# Patient Record
Sex: FEMALE | Race: WHITE | Employment: OTHER | URBAN - METROPOLITAN AREA
[De-identification: names, ages, dates, MRNs, and addresses within clinical notes are randomized per-mention and may not be internally consistent; named-entity substitution may affect disease eponyms.]

---

## 2022-03-18 PROBLEM — K21.9 GASTROESOPHAGEAL REFLUX DISEASE: Status: ACTIVE | Noted: 2017-03-29

## 2022-03-18 PROBLEM — J30.9 ALLERGIC RHINITIS: Status: ACTIVE | Noted: 2017-03-29

## 2022-03-19 PROBLEM — G89.29 CHRONIC PAIN: Status: ACTIVE | Noted: 2017-03-29

## 2022-03-19 PROBLEM — M19.049 DEGENERATIVE JOINT DISEASE OF HAND: Status: ACTIVE | Noted: 2017-03-29

## 2022-03-19 PROBLEM — M51.36 DEGENERATION OF LUMBAR INTERVERTEBRAL DISC: Status: ACTIVE | Noted: 2017-03-29

## 2022-03-19 PROBLEM — E04.2 NONTOXIC MULTINODULAR GOITER: Status: ACTIVE | Noted: 2017-03-29

## 2022-03-19 PROBLEM — M25.569 KNEE PAIN: Status: ACTIVE | Noted: 2017-03-29

## 2022-03-19 PROBLEM — R79.9 BLOOD CHEMISTRY ABNORMALITY: Status: ACTIVE | Noted: 2017-03-29

## 2022-03-19 PROBLEM — R13.10 DYSPHAGIA: Status: ACTIVE | Noted: 2017-03-29

## 2022-03-20 PROBLEM — M17.9 OSTEOARTHRITIS OF KNEE: Status: ACTIVE | Noted: 2017-03-29

## 2022-03-20 PROBLEM — R73.09 ABNORMAL GLUCOSE LEVEL: Status: ACTIVE | Noted: 2017-03-29

## 2022-03-20 PROBLEM — R25.2 SPASM: Status: ACTIVE | Noted: 2017-03-29

## 2022-03-20 PROBLEM — J45.909 ASTHMA: Status: ACTIVE | Noted: 2017-03-29

## 2022-03-20 PROBLEM — M81.0 OSTEOPOROSIS: Status: ACTIVE | Noted: 2017-03-29

## 2022-06-06 ENCOUNTER — TELEMEDICINE (OUTPATIENT)
Dept: RHEUMATOLOGY | Age: 72
End: 2022-06-06
Payer: MEDICARE

## 2022-06-06 DIAGNOSIS — M51.36 DEGENERATION OF LUMBAR INTERVERTEBRAL DISC: ICD-10-CM

## 2022-06-06 DIAGNOSIS — M50.30 DEGENERATION OF CERVICAL INTERVERTEBRAL DISC: Primary | ICD-10-CM

## 2022-06-06 DIAGNOSIS — M62.838 MUSCLE SPASM: ICD-10-CM

## 2022-06-06 DIAGNOSIS — Z79.1 LONG TERM (CURRENT) USE OF NON-STEROIDAL ANTI-INFLAMMATORIES (NSAID): ICD-10-CM

## 2022-06-06 PROCEDURE — 99443 PR PHYS/QHP TELEPHONE EVALUATION 21-30 MIN: CPT | Performed by: INTERNAL MEDICINE

## 2022-06-06 RX ORDER — TIZANIDINE 4 MG/1
TABLET ORAL
Qty: 45 TABLET | Refills: 5 | Status: SHIPPED | OUTPATIENT
Start: 2022-06-06

## 2022-06-06 NOTE — PROGRESS NOTES
appliable (enbrel, humira, simponi, cimzia, xeljanz, GARCIA, remicade, simponi aria, actemra, rituximab, Charlet Smooth, stelara, cosentyx): None      Past NSAIDs, if applicable (motrin, aleve, naproxen, advil, ibuprofen, celebrex, voltaren/diclofenac, etc.): Sulfasalazine, Naproxen, Mobic, Celebrex, Relafen in the past. Aleve, Advil PRN.   Currently on diclofenac 50 mg twice a day.      Last BMD: <2  years ago   Past osteoporosis drugs, if applicable (fosamax, actonel, boniva, reclast, prolia, forteo): Prolia last administered 2018 but decided not to pursue that since she does believe that it worsened her lower back pain.       Current exercise regimen, if any: runs [place] 7 days    Current vitamin D dose: D3 5000 iu daily. Current calcium dose: Calcium 400 mg once a day. Fractures since last visit, if any:  None    The patient otherwise has no significant interval changes in health or medical history to report.      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    Dysphagia 3/29/2017    Gastroesophageal reflux disease 3/29/2017    Knee pain 3/29/2017    Nontoxic multinodular goiter 3/29/2017    Osteoarthritis 3/29/2017    Osteoarthritis of knee 3/29/2017    Osteoporosis 3/29/2017    [facility]jogren's disease ([facility] [address]     Spasm 3/29/2017       Past Surgical History  Past Surgical History:   Procedure Laterality Date    BACK SURGERY  1987    CERVICAL FUSION  1987    5 & 6 neck    GI      EGD    TUBAL LIGATION         Family History  Family History   Problem Relation Age of Onset    Heart Disease Mother     Hypertension Brother     Heart Disease Brother     Obesity Brother     Diabetes Brother     Diabetes Brother     Heart Disease Father     Other Son         graves disease    Diabetes Father     Liver Disease Mother     Hypertension Father     Kidney Disease Mother     Hypertension Mother        Social History  Social History     Socioeconomic History    Marital status: Unknown     Spouse name: Not on file    Number of children: Not on file    Years of education: Not on file    Highest education level: Not on file   Occupational History    Not on file   Tobacco Use    Smoking status: Former Smoker     Start date: 1970     Quit date: 1974     Years since quittin.4    Smokeless tobacco: Never Used   Substance and Sexual Activity    Alcohol use: Yes    Drug use: No    Sexual activity: Not on file   Other Topics Concern    Not on file   Social History Narrative    Not on file     Social Determinants of Health     Financial Resource Strain:     Difficulty of Paying Living Expenses: Not on file   Food Insecurity:     Worried About Running Out of Food in the Last Year: Not on file    Sindi of Food in the Last Year: Not on file   Transportation Needs:     Lack of Transportation (Medical): Not on file    Lack of Transportation (Non-Medical):  Not on file   Physical Activity:     Days of Exercise per Week: Not on file    Minutes of Exercise per Session: Not on file   Stress:     Feeling of Stress : Not on file   Social Connections:     Frequency of Communication with Friends and Family: Not on file    Frequency of Social Gatherings with Friends and Family: Not on file    Attends Cheondoism Services: Not on file    Active Member of Clubs or Organizations: Not on file    Attends Club or Organization Meetings: Not on file    Marital Status: Not on file   Intimate Partner Violence:     Fear of Current or Ex-Partner: Not on file    Emotionally Abused: Not on file    Physically Abused: Not on file    Sexually Abused: Not on file   Housing Stability:     Unable to Pay for Housing in the Last Year: Not on file    Number of Places Lived in the Last Year: Not on file    Unstable Housing in the Last Year: Not on file               Allergy:  Allergies   Allergen Reactions    Cephalexin Anaphylaxis    Cephalosporins Anaphylaxis    Penicillins Anaphylaxis    Aspirin Nausea Only    Denosumab Other (See Comments)     Lower back pain.  Tetanus Immune Globulin Other (See Comments)    Cyclobenzaprine Other (See Comments)     Anxiety         Current Medications:  Outpatient Encounter Medications as of 6/6/2022   Medication Sig Dispense Refill    diclofenac (VOLTAREN) 50 MG EC tablet Take 1 pill twice a day after food. 60 tablet 5    diclofenac sodium (VOLTAREN) 1 % GEL Apply 4 g topically 4 times daily 300 g 5    tiZANidine (ZANAFLEX) 4 MG tablet Take 1 to 1 and 1/2 pills at bedtime. 45 tablet 5    albuterol (ACCUNEB) 1.25 MG/3ML nebulizer solution Inhale 1.25 mg into the lungs every 6 hours as needed      vitamin D3 (CHOLECALCIFEROL) 125 MCG (5000 UT) TABS tablet Take by mouth daily      desonide (DESOWEN) 0.05 % cream Apply topically 2 times daily      fluticasone propionate (FLOVENT) 50 MCG/BLIST AEPB inhaler Inhale into the lungs      levocetirizine (XYZAL) 5 MG tablet Take by mouth      L-Lysine 500 MG TABS Take by mouth 3 times daily (with meals)      montelukast (SINGULAIR) 10 MG tablet Take 10 mg by mouth daily      olopatadine (PATADAY) 0.2 % SOLN ophthalmic solution Apply 1 drop to eye daily      omeprazole (PRILOSEC) 40 MG delayed release capsule Take 40 mg by mouth daily      [DISCONTINUED] diclofenac sodium (VOLTAREN) 1 % GEL Apply topically 4 times daily      [DISCONTINUED] diclofenac (VOLTAREN) 50 MG EC tablet Take 1 pill twice a day after food.  [DISCONTINUED] tiZANidine (ZANAFLEX) 4 MG tablet Take 1 to 1 and 1/2 pills at bedtime. No facility-administered encounter medications on file as of 6/6/2022.            REVIEW OF SYSTEMS: The following systems were reviewed with patient today and were negative except for the following (depicted with an \"X\"):        \"X\" General  \"X\" Head and Neck  \"X\" Heart and Breathing  \"X\" Gastrointestinal    Fever/chills   Hair loss   Shortness of breath   Upset stomach    Falls   Dry mouth   Coughing   Diarrhea / constipation    Wt loss   Mouth sores   Wheezing  x Heartburn    Wt gain   Ringing ears   Chest pain   Dark or bloody stools    Night sweats   Diff. swallowing  X None of above   Nausea or vomiting   X None of above  X None of above      None of above                \"X\" Skin  \"X\" Neurology  \"X\" Urinary/Gyn  \"X\" Other    Easy bruising   Numbness/ tingling   Female problems   Depression    Rashes   Weakness   Problems with urination   Feeling anxious    Sun sensitivity   Headaches  X None of above  x Problems sleeping   X None of above  X None of above      None of above          Physical Exam:  There were no vitals taken for this visit. General:  Patient alert, cooperative and in no apparent distress. Neurologic:  Oriented, normal speech and affect. Radiology Reports Reviewed (if available):  Last 3 months  [unfilled]    Lab Reports Reviewed (if available): Last 3 months    No visits with results within 3 Month(s) from this visit.    Latest known visit with results is:   Office Visit on 06/02/2020   Component Date Value Ref Range Status    Glucose 06/02/2020 112* 65 - 99 mg/dL Final    BUN 06/02/2020 19  8 - 27 mg/dL Final    CREATININE 06/02/2020 0.76  0.57 - 1.00 mg/dL Final    EGFR IF NonAfrican American 06/02/2020 81  >59 mL/min/1.73 Final    GFR  06/02/2020 94  >59 mL/min/1.73 Final    Bun/Cre Ratio 06/02/2020 25  12 - 28 NA Final    Sodium 06/02/2020 132* 134 - 144 mmol/L Final    Potassium 06/02/2020 4.5  3.5 - 5.2 mmol/L Final    Chloride 06/02/2020 96  96 - 106 mmol/L Final    CO2 06/02/2020 22  20 - 29 mmol/L Final    Calcium 06/02/2020 9.5  8.7 - 10.3 mg/dL Final    Total Protein 06/02/2020 7.2 6.0 - 8.5 g/dL Final    Albumin 06/02/2020 4.4  3.8 - 4.8 g/dL Final    Globulin, Total 06/02/2020 2.8  1.5 - 4.5 g/dL Final    Albumin/Globulin Ratio 06/02/2020 1.6  1.2 - 2.2 NA Final    Total Bilirubin 06/02/2020 0.3  0.0 - 1.2 mg/dL Final    Alkaline Phosphatase 06/02/2020 61  39 - 117 IU/L Final    AST 06/02/2020 18  0 - 40 IU/L Final    ALT 06/02/2020 19  0 - 32 IU/L Final         The results above were reviewed and discussed with patient. Assessment/Plan:   Ramesh Santamaria is a 73 y.o. female who presents with:     1. Degeneration of cervical intervertebral disc: She was instructed to continue diclofenac 50 mg to be taken twice a day after food. While on oral diclofenac patient was counseled to avoid any over-the-counter NSAID's such as Advil or Aleve. -     diclofenac (VOLTAREN) 50 MG EC tablet; Take 1 pill twice a day after food. -     diclofenac sodium (VOLTAREN) 1 % GEL; Apply 4 g topically 4 times daily    2. Degeneration of lumbar intervertebral disc: She was instructed to continue Voltaren 1% gel 4 g to be applied up to 4 times a day as needed. -     diclofenac (VOLTAREN) 50 MG EC tablet; Take 1 pill twice a day after food. -     diclofenac sodium (VOLTAREN) 1 % GEL; Apply 4 g topically 4 times daily    3. Muscle spasm: I did instruct the patient to continue tizanidine 4 mg 1 to 1 and half tablets at bedtime as needed to help with muscle spasms. -     tiZANidine (ZANAFLEX) 4 MG tablet; Take 1 to 1 and 1/2 pills at bedtime. 4. Long term (current) use of non-steroidal anti-inflammatories (nsaid): Patient does plan on having lab work done at her local pharmacy. If there is any noted abnormality I will keep the patient informed but if not I will review her labs with her on follow-up. -     Comprehensive Metabolic Panel; Future    Ramesh Santamaria, was evaluated through a synchronous audio only encounter.  The patient (or guardian if applicable) is aware that this is a billable service, which includes applicable co-pays. This Virtual Visit was conducted with patient's (and/or legal guardian's) consent. The visit was conducted pursuant to the emergency declaration under the [address] [place] [address] authority and the [employer] Act. Patient identification was verified, and a caregiver was present when appropriate. The patient was located at home . Provider was located at the office. Total time spent for this encounter: 27 minutes    --Kiah Barnes MD on 6/6/2022 at 1:57 PM    An electronic signature was used to authenticate this note. Disease activity plan:  As stated above. Steroid management plan:  As stated above, if applicable. Pain management plan:  As stated above, if applicable. Weight management plan:  Weight loss through diet and exercise is always encouraged    Disease prognosis: Good        I appreciate the opportunity to continue to participate in the care of this patient. Follow-up and Dispositions    · Return in about 5 months (around 11/6/2022). Electronically signed by:  Vanna Perez MD      This note was dictated using dragon voice recognition software.   It has been proofread, but there may still exist voice recognition errors that the author did not detect.                --------------------------------------------------------------------------------------------------------------------------------------------------------------------------------------------------------------------------------

## 2022-11-17 ENCOUNTER — TELEPHONE (OUTPATIENT)
Dept: RHEUMATOLOGY | Age: 72
End: 2022-11-17

## 2022-11-17 NOTE — TELEPHONE ENCOUNTER
Pt lvm regarding no show letter she received for missed appointment on 11/10/22. She states that she \"thought she made it CRYSTAL (10X) clear\" that she would not be coming to any appointments in our office while \"face diapers\" were required. She referenced her thoughts on the [facility] and \"plandemic\" and said this disease was made up in a lab and there was no way she was doing any visit besides a phone visit and she thought she already made herself CRYSTAL (4X) clear on this. She also said that \"some people need more of a reminder when they make an appointment than one day\". Pt does not seem to be aware of the lifted mask mandate done one month ago. Reminder calls are both automated starting one week in advance and also in person 1-2 days before.

## 2022-12-14 ENCOUNTER — TELEMEDICINE (OUTPATIENT)
Dept: RHEUMATOLOGY | Age: 72
End: 2022-12-14
Payer: MEDICARE

## 2022-12-14 DIAGNOSIS — M50.30 DEGENERATION OF CERVICAL INTERVERTEBRAL DISC: Primary | ICD-10-CM

## 2022-12-14 DIAGNOSIS — Z79.1 LONG TERM (CURRENT) USE OF NON-STEROIDAL ANTI-INFLAMMATORIES (NSAID): ICD-10-CM

## 2022-12-14 DIAGNOSIS — M51.36 DEGENERATION OF LUMBAR INTERVERTEBRAL DISC: ICD-10-CM

## 2022-12-14 DIAGNOSIS — M62.838 MUSCLE SPASM: ICD-10-CM

## 2022-12-14 PROCEDURE — 99443 PR PHYS/QHP TELEPHONE EVALUATION 21-30 MIN: CPT | Performed by: INTERNAL MEDICINE

## 2022-12-14 NOTE — PROGRESS NOTES
Mike Swanson M.D.  [address]   Office : [phone], Fax: [phone] OFFICE VISIT NOTE  Date of Visit:  2022 10:19 AM    Patient Information:  Name:  Haseeb Miller  :  1950  Age:  73 y.o. Gender:  female      Ms. Dina Hoffman is here today for follow-up of OA and muscle spasms. Last visit: 22    History of Present Illness: On talking to the patient today she states that she has not had any cough, congestion, fever or chills secondary to allergy related problems. Has had a reaction to the flu shot so she does not get the flu shot yearly. Her current joint complaints are as mentioned below. Since the last visit, patient is feeling \"poor\". Pain: 8/10  Location:  Some lower back pain. Some neck stiffness with some pain with neck ROM. Occasional headaches. Some para spinal muscle pain. Some right shoulder pain with occasional left shoulder pain. Bilateral elbow pain with no swelling, warmth and redness. Bilateral wrist pain with swelling with some warmth and redness. Some pain in the MCP and PIP joints with swelling with some warmth but no redness. Quality:  Deep achy pain. Modifying Factors:  1st thing in the morning the pain and stiffness is the worst.   Associated Symptoms:  Intermittent tingling, numbness and pain down the right arm with pain but no tingling and numbness from the left buttock to the toes of the left foot. Some left leg weakness with some right arm weakness to some extent as well. Has a weak  worse in the left hand than the right hand. Needs help with opening jars and buttoning and unbuttoning.      Last TB screen: 20 + years ago  TB result: Negative      Current dose of steroids: None  How long on current dose of steroids: NA  How long on continuous steroid therapy: NA      Past DMARDs, if applicable (methotrexate, plaquenil/hydroxychloroquine, sulfasalazine, Arava/leflunomide): None Past biologics, if appliable (enbrel, humira, simponi, cimzia, Leala Mooring, GARCIA, remicade, simponi aria, actemra, rituximab, [place], stelara, cosentyx): None      Past NSAIDs, if applicable (motrin, aleve, naproxen, advil, ibuprofen, celebrex, voltaren/diclofenac, etc.): Sulfasalazine, Naproxen, Mobic, Celebrex, Relafen in the past. Aleve, Advil PRN. Currently on diclofenac 50 mg twice a day. Last BMD: <2  years ago   Past osteoporosis drugs, if applicable (fosamax, actonel, boniva, reclast, prolia, forteo): Prolia last administered 2018 but decided not to pursue that since she does believe that it worsened her lower back pain. Current exercise regimen, if any: runs [place] 7 days    Current vitamin D dose: D3 5000 iu daily. Current calcium dose: Calcium 400 mg once a day. Fractures since last visit, if any:  None    The patient otherwise has no significant interval changes in health or medical history to report.      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    Dysphagia 3/29/2017    Gastroesophageal reflux disease 3/29/2017    Knee pain 3/29/2017    Nontoxic multinodular goiter 3/29/2017    Osteoarthritis 3/29/2017    Osteoarthritis of knee 3/29/2017    Osteoporosis 3/29/2017    Sjogren's disease ([facility] [address]     Spasm 3/29/2017       Past Surgical History  Past Surgical History:   Procedure Laterality Date    BACK SURGERY  1987    CERVICAL FUSION  1987    5 & 6 neck    GI      EGD    TUBAL LIGATION         Family History  Family History   Problem Relation Age of Onset    Heart Disease Mother     Hypertension Brother     Heart Disease Brother     Obesity Brother     Diabetes Brother     Diabetes Brother     Heart Disease Father     Other Son         graves disease    Diabetes Father     Liver Disease Mother     Hypertension Father     Kidney Disease Mother     Hypertension Mother        Social History  Social History     Socioeconomic History    Marital status: Unknown   Tobacco Use    Smoking status: Former     Types: Cigarettes     Start date: 1970     Quit date: 1974     Years since quittin.9    Smokeless tobacco: Never   Substance and Sexual Activity    Alcohol use: Yes    Drug use: No       Allergy:  Allergies   Allergen Reactions    Cephalexin Anaphylaxis    Cephalosporins Anaphylaxis    Penicillins Anaphylaxis    Aspirin Nausea Only    Denosumab Other (See Comments)     Lower back pain. Tetanus Immune Globulin Other (See Comments)    Cyclobenzaprine Other (See Comments)     Anxiety         Current Medications:  Outpatient Encounter Medications as of 2022   Medication Sig Dispense Refill    diclofenac (VOLTAREN) 50 MG EC tablet Take 1 pill twice a day after food. 60 tablet 5    diclofenac sodium (VOLTAREN) 1 % GEL Apply 4 g topically 4 times daily 300 g 5    tiZANidine (ZANAFLEX) 4 MG tablet Take 1 to 1 and 1/2 pills at bedtime.  45 tablet 5    albuterol (ACCUNEB) 1.25 MG/3ML nebulizer solution Inhale 1.25 mg into the lungs every 6 hours as needed      vitamin D3 (CHOLECALCIFEROL) 125 MCG (5000 UT) TABS tablet Take by mouth daily      desonide (DESOWEN) 0.05 % cream Apply topically 2 times daily      fluticasone propionate (FLOVENT) 50 MCG/BLIST AEPB inhaler Inhale into the lungs      levocetirizine (XYZAL) 5 MG tablet Take by mouth      L-Lysine 500 MG TABS Take by mouth 3 times daily (with meals)      montelukast (SINGULAIR) 10 MG tablet Take 10 mg by mouth daily      olopatadine (PATADAY) 0.2 % SOLN ophthalmic solution Apply 1 drop to eye daily      omeprazole (PRILOSEC) 40 MG delayed release capsule Take 40 mg by mouth daily       No facility-administered encounter medications on file as of 12/14/2022. REVIEW OF SYSTEMS: The following systems were reviewed with patient today and were negative except for the following (depicted with an \"X\"):        \"X\" General  \"X\" Head and Neck  \"X\" Heart and Breathing  \"X\" Gastrointestinal    Fever/chills   Hair loss   Shortness of breath   Upset stomach    Falls   Dry mouth   Coughing   Diarrhea / constipation    Wt loss   Mouth sores   Wheezing  x Heartburn    Wt gain   Ringing ears   Chest pain   Dark or bloody stools    Night sweats   Diff. swallowing  X None of above   Nausea or vomiting   X None of above  X None of above      None of above                \"X\" Skin  \"X\" Neurology  \"X\" Urinary/Gyn  \"X\" Other    Easy bruising   Numbness/ tingling   Female problems   Depression    Rashes   Weakness   Problems with urination   Feeling anxious    Sun sensitivity   Headaches  X None of above  x Problems sleeping   X None of above  X None of above      None of above          Physical Exam:  There were no vitals taken for this visit. General:  Patient alert, cooperative and in no apparent distress. Neurologic:  Oriented, normal speech and affect. Radiology Reports Reviewed (if available):  Last 3 months  [unfilled]    Lab Reports Reviewed (if available): Last 3 months    No visits with results within 3 Month(s) from this visit.    Latest known visit with results is:   Office Visit on 06/02/2020   Component Date Value Ref Range Status    Glucose 06/02/2020 112 (A)  65 - 99 mg/dL Final    BUN 06/02/2020 19  8 - 27 mg/dL Final    Creatinine 06/02/2020 0.76  0.57 - 1.00 mg/dL Final    EGFR IF NonAfrican American 06/02/2020 81  >59 mL/min/1.73 Final    GFR  06/02/2020 94  >59 mL/min/1.73 Final    Bun/Cre Ratio 06/02/2020 25  12 - 28 NA Final    Sodium 06/02/2020 132 (A)  134 - 144 mmol/L Final    Potassium 06/02/2020 4.5  3.5 - 5.2 mmol/L Final    Chloride 06/02/2020 96  96 - 106 mmol/L Final    CO2 06/02/2020 22  20 - 29 mmol/L Final Calcium 06/02/2020 9.5  8.7 - 10.3 mg/dL Final    Total Protein 06/02/2020 7.2  6.0 - 8.5 g/dL Final    Albumin 06/02/2020 4.4  3.8 - 4.8 g/dL Final    Globulin, Total 06/02/2020 2.8  1.5 - 4.5 g/dL Final    Albumin/Globulin Ratio 06/02/2020 1.6  1.2 - 2.2 NA Final    Total Bilirubin 06/02/2020 0.3  0.0 - 1.2 mg/dL Final    Alkaline Phosphatase 06/02/2020 61  39 - 117 IU/L Final    AST 06/02/2020 18  0 - 40 IU/L Final    ALT 06/02/2020 19  0 - 32 IU/L Final         The results above were reviewed and discussed with patient. Assessment/Plan:   Shakeel Garvin is a 73 y.o. female who presents with:     Degeneration of cervical intervertebral disc: She was instructed to continue diclofenac 50 mg 1 pill to be taken twice a day after food while remaining on the Voltaren 1% gel as needed for added pain relief. Patient is aware that while on diclofenac she would need to avoid any over-the-counter NSAID's such as Advil or Aleve. Degeneration of lumbar intervertebral disc: She was instructed to continue Voltaren gel 1% 4 g to be applied 4 times a day as needed for added pain relief. Muscle spasm: I did instruct the patient to continue tizanidine 4 mg 1 to 1-1/2 pills at bedtime as needed to help with muscle spasms. Long term (current) use of non-steroidal anti-inflammatories (nsaid): I did explain to patient that unless I get the lab work from June mailed back to our office and she has had lab work from today that is going to be mailed to her home address I will not be able to renew her prescriptions for the diclofenac or the tizanidine. Patient did verbalize understanding.  -     Comprehensive Metabolic Panel; Future     Total Time: 23 minutes. Shakeel Garvin was evaluated through a synchronous (real-time) audio encounter. Patient identification was verified at the start of the visit. She (or guardian if applicable) is aware that this is a billable service, which includes applicable co-pays.  This visit was conducted with the patient's (and/or legal guardian's) verbal consent. She has not had a related appointment within my department in the past 7 days or scheduled within the next 24 hours. The patient was located at home. The provider was located at home. Note: not billable if this call serves to triage the patient into an appointment for the relevant concern    Matias St MD       Disease activity plan:  As stated above. Steroid management plan:  As stated above, if applicable. Pain management plan:  As stated above, if applicable. Weight management plan:  Weight loss through diet and exercise is always encouraged    Disease prognosis: Good    I appreciate the opportunity to continue to participate in the care of this patient. Follow-up and Dispositions    Return in about 6 months (around 6/14/2023). Electronically signed by:  Cynthia Rascon MD      This note was dictated using dragon voice recognition software.   It has been proofread, but there may still exist voice recognition errors that the author did not detect.                --------------------------------------------------------------------------------------------------------------------------------------------------------------------------------------------------------------------------------

## 2023-03-30 DIAGNOSIS — M51.36 DEGENERATION OF LUMBAR INTERVERTEBRAL DISC: ICD-10-CM

## 2023-03-30 DIAGNOSIS — M50.30 DEGENERATION OF CERVICAL INTERVERTEBRAL DISC: ICD-10-CM

## 2023-04-03 DIAGNOSIS — M51.36 DEGENERATION OF LUMBAR INTERVERTEBRAL DISC: ICD-10-CM

## 2023-04-03 DIAGNOSIS — M50.30 DEGENERATION OF CERVICAL INTERVERTEBRAL DISC: ICD-10-CM

## 2023-04-03 DIAGNOSIS — M62.838 MUSCLE SPASM: ICD-10-CM

## 2023-04-03 RX ORDER — TIZANIDINE 4 MG/1
TABLET ORAL
Qty: 45 TABLET | Refills: 5 | Status: SHIPPED | OUTPATIENT
Start: 2023-04-03

## 2023-04-03 NOTE — TELEPHONE ENCOUNTER
Per patient needs refill she was told at her appt in Dec we were sening in 6 mo rx , per patient pharmacy never got a 6 mo rx , please send in rx has appt 6/12/23.  [address]

## 2023-07-27 ENCOUNTER — OFFICE VISIT (OUTPATIENT)
Dept: RHEUMATOLOGY | Age: 73
End: 2023-07-27
Payer: MEDICARE

## 2023-07-27 VITALS
WEIGHT: 136.8 LBS | SYSTOLIC BLOOD PRESSURE: 116 MMHG | BODY MASS INDEX: 21.98 KG/M2 | HEIGHT: 66 IN | DIASTOLIC BLOOD PRESSURE: 63 MMHG | HEART RATE: 83 BPM

## 2023-07-27 DIAGNOSIS — M50.30 DEGENERATION OF CERVICAL INTERVERTEBRAL DISC: Primary | ICD-10-CM

## 2023-07-27 DIAGNOSIS — Z79.1 LONG TERM (CURRENT) USE OF NON-STEROIDAL ANTI-INFLAMMATORIES (NSAID): ICD-10-CM

## 2023-07-27 DIAGNOSIS — M51.36 DEGENERATION OF LUMBAR INTERVERTEBRAL DISC: ICD-10-CM

## 2023-07-27 DIAGNOSIS — M62.838 MUSCLE SPASM: ICD-10-CM

## 2023-07-27 DIAGNOSIS — M81.0 AGE-RELATED OSTEOPOROSIS WITHOUT CURRENT PATHOLOGICAL FRACTURE: ICD-10-CM

## 2023-07-27 PROCEDURE — 1090F PRES/ABSN URINE INCON ASSESS: CPT | Performed by: INTERNAL MEDICINE

## 2023-07-27 PROCEDURE — 99214 OFFICE O/P EST MOD 30 MIN: CPT | Performed by: INTERNAL MEDICINE

## 2023-07-27 PROCEDURE — G8400 PT W/DXA NO RESULTS DOC: HCPCS | Performed by: INTERNAL MEDICINE

## 2023-07-27 PROCEDURE — 3017F COLORECTAL CA SCREEN DOC REV: CPT | Performed by: INTERNAL MEDICINE

## 2023-07-27 PROCEDURE — G8420 CALC BMI NORM PARAMETERS: HCPCS | Performed by: INTERNAL MEDICINE

## 2023-07-27 PROCEDURE — G8427 DOCREV CUR MEDS BY ELIG CLIN: HCPCS | Performed by: INTERNAL MEDICINE

## 2023-07-27 PROCEDURE — 1123F ACP DISCUSS/DSCN MKR DOCD: CPT | Performed by: INTERNAL MEDICINE

## 2023-07-27 PROCEDURE — 1036F TOBACCO NON-USER: CPT | Performed by: INTERNAL MEDICINE

## 2023-07-27 RX ORDER — CELECOXIB 200 MG/1
200 CAPSULE ORAL DAILY
Qty: 60 CAPSULE | Refills: 5 | Status: SHIPPED | OUTPATIENT
Start: 2023-07-27

## 2023-07-27 RX ORDER — TIZANIDINE 4 MG/1
TABLET ORAL
Qty: 45 TABLET | Refills: 5 | Status: SHIPPED | OUTPATIENT
Start: 2023-07-27

## 2023-07-27 ASSESSMENT — JOINT PAIN
TOTAL NUMBER OF TENDER JOINTS: 0
TOTAL NUMBER OF SWOLLEN JOINTS: 0

## 2023-07-27 ASSESSMENT — ROUTINE ASSESSMENT OF PATIENT INDEX DATA (RAPID3)
ON A SCALE OF ONE TO TEN, HOW MUCH OF A PROBLEM HAS UNUSUAL FATIGUE OR TIREDNESS BEEN FOR YOU OVER THE PAST WEEK?: 3
ON A SCALE OF ONE TO TEN, CONSIDERING ALL THE WAYS IN WHICH ILLNESS AND HEALTH CONDITIONS MAY AFFECT YOU AT THIS TIME, PLEASE INDICATE BELOW HOW YOU ARE DOING:: 5
WHEN YOU AWAKENED IN THE MORNING OVER THE LAST WEEK, PLEASE INDICATE THE AMOUNT OF TIME IT TAKES UNTIL YOU ARE AS LIMBER AS YOU WILL BE FOR THE DAY: > 1 HOUR
ON A SCALE OF ONE TO TEN, HOW DIFFICULT WAS IT FOR YOU TO COMPLETE THE LISTED DAILY PHYSICAL TASKS OVER THE LAST WEEK: 1.0
ON A SCALE OF ONE TO TEN, HOW MUCH PAIN HAVE YOU HAD BECAUSE OF YOUR CONDITION OVER THE PAST WEEK?: 7

## 2023-07-27 NOTE — PROGRESS NOTES
Chilo Carlton M.D.  [address] [place] [address], [facility], [address] [place] [address]  Office : [phone], Fax: [phone] OFFICE VISIT NOTE  Date of Visit:  2023 5:36 PM    Patient Information:  Name:  Noe Long  :  1950  Age:  81 y.o. Gender:  female      Ms. Enio Hoffman is here today for follow-up of OA, osteoporosis, muscle spasms and medication monitoring. Last visit: 2022     History of Present Illness: On talking to the patient today she states that she wants to be switched from diclofenac back to celebrex since the diclofenac does seem to be causing abdominal pain with no melena or hematochezia though. She has experienced occasional GERD and takes Prilosec as needed to help her symptoms. She states that her back still seems to be bothering her after she injured it in November. With regard to seasonal allergies she has had a runny nose but denies any cough, fever or chills. Her other current joint complaints as mentioned below. Since the last visit, patient is feeling \"fair\". Pain: 10  Location: Some lower back. Quality: Deep achy in nature. Modifying Factors: Squatting as well as prolonged standing and walking does seem to worsen her lower back pain. Associated Symptoms:  No tingling, numbness or pain down the right arm with intermittent pain, tingling and numbness down the left arm from the shoulder to the fingertips. No pain, tingling or numbness down her legs. Some left arm weakness. Denies any limitations with regard to her activities of daily living except difficulty opening jars.     DMARD/Biologic 2023   AM Stiffness > 1 hour   Pain 7   Fatigue 3   MDHAQ 1.0   Patient Global Score 5   Medication Name Other   Other Medication diclofenac     Last TB screen: 20 + years ago  TB result: Negative      Current dose of steroids: None  How long on current dose of steroids: NA  How long on continuous steroid

## 2023-07-28 ENCOUNTER — TELEPHONE (OUTPATIENT)
Dept: RHEUMATOLOGY | Age: 73
End: 2023-07-28

## 2023-07-28 LAB
25(OH)D3 SERPL-MCNC: 41.5 NG/ML (ref 30–100)
ALBUMIN SERPL-MCNC: 3.9 G/DL (ref 3.2–4.6)
ALBUMIN/GLOB SERPL: 1.1 (ref 0.4–1.6)
ALP SERPL-CCNC: 74 U/L (ref 50–136)
ALT SERPL-CCNC: 18 U/L (ref 12–65)
ANION GAP SERPL CALC-SCNC: 12 MMOL/L (ref 2–11)
AST SERPL-CCNC: 15 U/L (ref 15–37)
BILIRUB SERPL-MCNC: 0.5 MG/DL (ref 0.2–1.1)
BUN SERPL-MCNC: 26 MG/DL (ref 8–23)
CALCIUM SERPL-MCNC: 9.1 MG/DL (ref 8.3–10.4)
CHLORIDE SERPL-SCNC: 104 MMOL/L (ref 101–110)
CO2 SERPL-SCNC: 20 MMOL/L (ref 21–32)
CREAT SERPL-MCNC: 0.8 MG/DL (ref 0.6–1)
GLOBULIN SER CALC-MCNC: 3.6 G/DL (ref 2.8–4.5)
GLUCOSE SERPL-MCNC: 103 MG/DL (ref 65–100)
POTASSIUM SERPL-SCNC: 4.2 MMOL/L (ref 3.5–5.1)
PROT SERPL-MCNC: 7.5 G/DL (ref 6.3–8.2)
SODIUM SERPL-SCNC: 136 MMOL/L (ref 133–143)

## 2023-07-28 NOTE — TELEPHONE ENCOUNTER
Pharmacy called to get a clarification on Celebrex as the directions say 1 tab daily but the QTY that was sent is #60.     celecoxib (CELEBREX) 200 MG capsule;  Take 1 capsule by mouth daily

## 2024-02-01 ENCOUNTER — OFFICE VISIT (OUTPATIENT)
Dept: RHEUMATOLOGY | Age: 74
End: 2024-02-01
Payer: MEDICARE

## 2024-02-01 VITALS
DIASTOLIC BLOOD PRESSURE: 92 MMHG | SYSTOLIC BLOOD PRESSURE: 137 MMHG | HEART RATE: 75 BPM | WEIGHT: 136 LBS | BODY MASS INDEX: 21.86 KG/M2 | HEIGHT: 66 IN

## 2024-02-01 DIAGNOSIS — M51.36 DEGENERATION OF LUMBAR INTERVERTEBRAL DISC: Primary | ICD-10-CM

## 2024-02-01 DIAGNOSIS — Z79.1 LONG TERM (CURRENT) USE OF NON-STEROIDAL ANTI-INFLAMMATORIES (NSAID): ICD-10-CM

## 2024-02-01 DIAGNOSIS — M50.30 DEGENERATION OF CERVICAL INTERVERTEBRAL DISC: ICD-10-CM

## 2024-02-01 DIAGNOSIS — M62.838 MUSCLE SPASM: ICD-10-CM

## 2024-02-01 LAB
ALBUMIN SERPL-MCNC: 4.1 G/DL (ref 3.2–4.6)
ALBUMIN/GLOB SERPL: 1.1 (ref 0.4–1.6)
ALP SERPL-CCNC: 60 U/L (ref 50–136)
ALT SERPL-CCNC: 23 U/L (ref 12–65)
ANION GAP SERPL CALC-SCNC: 6 MMOL/L (ref 2–11)
AST SERPL-CCNC: 16 U/L (ref 15–37)
BILIRUB SERPL-MCNC: 0.4 MG/DL (ref 0.2–1.1)
BUN SERPL-MCNC: 16 MG/DL (ref 8–23)
CALCIUM SERPL-MCNC: 9.3 MG/DL (ref 8.3–10.4)
CHLORIDE SERPL-SCNC: 102 MMOL/L (ref 103–113)
CO2 SERPL-SCNC: 25 MMOL/L (ref 21–32)
CREAT SERPL-MCNC: 0.6 MG/DL (ref 0.6–1)
GLOBULIN SER CALC-MCNC: 3.7 G/DL (ref 2.8–4.5)
GLUCOSE SERPL-MCNC: 101 MG/DL (ref 65–100)
POTASSIUM SERPL-SCNC: 4.2 MMOL/L (ref 3.5–5.1)
PROT SERPL-MCNC: 7.8 G/DL (ref 6.3–8.2)
SODIUM SERPL-SCNC: 133 MMOL/L (ref 136–146)

## 2024-02-01 PROCEDURE — 1036F TOBACCO NON-USER: CPT | Performed by: INTERNAL MEDICINE

## 2024-02-01 PROCEDURE — G8427 DOCREV CUR MEDS BY ELIG CLIN: HCPCS | Performed by: INTERNAL MEDICINE

## 2024-02-01 PROCEDURE — G8420 CALC BMI NORM PARAMETERS: HCPCS | Performed by: INTERNAL MEDICINE

## 2024-02-01 PROCEDURE — G8484 FLU IMMUNIZE NO ADMIN: HCPCS | Performed by: INTERNAL MEDICINE

## 2024-02-01 PROCEDURE — G8399 PT W/DXA RESULTS DOCUMENT: HCPCS | Performed by: INTERNAL MEDICINE

## 2024-02-01 PROCEDURE — 1090F PRES/ABSN URINE INCON ASSESS: CPT | Performed by: INTERNAL MEDICINE

## 2024-02-01 PROCEDURE — 99214 OFFICE O/P EST MOD 30 MIN: CPT | Performed by: INTERNAL MEDICINE

## 2024-02-01 PROCEDURE — 1123F ACP DISCUSS/DSCN MKR DOCD: CPT | Performed by: INTERNAL MEDICINE

## 2024-02-01 PROCEDURE — 3017F COLORECTAL CA SCREEN DOC REV: CPT | Performed by: INTERNAL MEDICINE

## 2024-02-01 RX ORDER — CELECOXIB 200 MG/1
200 CAPSULE ORAL DAILY
Qty: 60 CAPSULE | Refills: 5 | Status: SHIPPED | OUTPATIENT
Start: 2024-02-01

## 2024-02-01 RX ORDER — TIZANIDINE 4 MG/1
TABLET ORAL
Qty: 45 TABLET | Refills: 5 | Status: SHIPPED | OUTPATIENT
Start: 2024-02-01

## 2024-02-01 ASSESSMENT — ROUTINE ASSESSMENT OF PATIENT INDEX DATA (RAPID3)
ON A SCALE OF ONE TO TEN, HOW MUCH OF A PROBLEM HAS UNUSUAL FATIGUE OR TIREDNESS BEEN FOR YOU OVER THE PAST WEEK?: 5
ON A SCALE OF ONE TO TEN, HOW DIFFICULT WAS IT FOR YOU TO COMPLETE THE LISTED DAILY PHYSICAL TASKS OVER THE LAST WEEK: 1.1
ON A SCALE OF ONE TO TEN, CONSIDERING ALL THE WAYS IN WHICH ILLNESS AND HEALTH CONDITIONS MAY AFFECT YOU AT THIS TIME, PLEASE INDICATE BELOW HOW YOU ARE DOING:: 7
WHEN YOU AWAKENED IN THE MORNING OVER THE LAST WEEK, PLEASE INDICATE THE AMOUNT OF TIME IT TAKES UNTIL YOU ARE AS LIMBER AS YOU WILL BE FOR THE DAY: > 1 HOUR
ON A SCALE OF ONE TO TEN, HOW MUCH PAIN HAVE YOU HAD BECAUSE OF YOUR CONDITION OVER THE PAST WEEK?: 6

## 2024-02-01 ASSESSMENT — JOINT PAIN
TOTAL NUMBER OF TENDER JOINTS: 4
TOTAL NUMBER OF SWOLLEN JOINTS: 0

## 2024-02-01 NOTE — PROGRESS NOTES
[facility]  Ursula Ramos M.D.  [address] [facility] [facility] [address] , [address]   [facility]  Office : [phone], Fax: [phone]     RHEUMATOLOGY OFFICE VISIT NOTE  Date of Visit:  2024 2:56 PM    Patient Information:  Name:  Ashley Bullock  :  1950  Age:  73 y.o.   Gender:  female      Ms. Bullock is here today for follow-up of  OA, osteoporosis, muscle spasms and medication monitoring.     Last visit: 23    History of Present Illness: On talking to the patient today she states that she has not had any cough, congestion, fever or chills secondary to seasonal allergies. She hurt her lower back in  and is just now getting to where she can do things again.  Her other current joint complaints are as mentioned below.    Since the last visit, patient is feeling \"fair\".    Pain: 6/10  Location:  Some lower back pain. Bilateral thumb pain with no swelling, warmth and redness. Bilateral wrist pain with no swelling, warmth and redness. Bilateral elbow pain with swelling with no warmth and redness. Some right worse than the left shoulder pain. Some neck stiffness with some para spinal muscle pain. Occasional tension headaches. Bilateral shoulder with bilateral knee and hip pain. Bilateral feet pain with no swelling, warmth and redness.   Quality:  Sharp pain.   Modifying Factors:  Going down stairs worsens the knee pain,   Associated Symptoms:  Some LE and UE weakness. Needs help with opening jars but does not have cloths that need buttoning and unbuttoning. Has a weak .         2024    12:00 PM   DMARD/Biologic   AM Stiffness > 1 hour   Pain 6   Fatigue 5   MDHAQ 1.1   Patient Global Score 7     Last TB screen: 20 + years ago  TB result: Negative      Current dose of steroids: None  How long on current dose of steroids: NA  How long on continuous steroid therapy: NA      Past DMARDs, if applicable (methotrexate, plaquenil/hydroxychloroquine,